# Patient Record
Sex: FEMALE | Race: BLACK OR AFRICAN AMERICAN | Employment: OTHER | URBAN - METROPOLITAN AREA
[De-identification: names, ages, dates, MRNs, and addresses within clinical notes are randomized per-mention and may not be internally consistent; named-entity substitution may affect disease eponyms.]

---

## 2018-10-10 ENCOUNTER — HOSPITAL ENCOUNTER (OUTPATIENT)
Dept: LAB | Age: 73
Discharge: HOME | End: 2018-10-10
Payer: MEDICARE

## 2018-10-10 LAB
ALBUMIN SERPL-MCNC: 3.5 G/DL (ref 3.4–5)
ALBUMIN/GLOB SERPL: 0.9 {RATIO} (ref 0.8–1.7)
ALP SERPL-CCNC: 88 U/L (ref 45–117)
ALT SERPL-CCNC: 23 U/L (ref 13–56)
ANION GAP SERPL CALC-SCNC: 6 MMOL/L (ref 3–18)
AST SERPL-CCNC: 18 U/L (ref 15–37)
BASOPHILS # BLD: 0 K/UL (ref 0–0.1)
BASOPHILS NFR BLD: 0 % (ref 0–2)
BILIRUB SERPL-MCNC: 0.3 MG/DL (ref 0.2–1)
BUN SERPL-MCNC: 27 MG/DL (ref 7–18)
BUN/CREAT SERPL: 24 (ref 12–20)
CALCIUM SERPL-MCNC: 10 MG/DL (ref 8.5–10.1)
CHLORIDE SERPL-SCNC: 101 MMOL/L (ref 100–108)
CO2 SERPL-SCNC: 31 MMOL/L (ref 21–32)
CREAT SERPL-MCNC: 1.13 MG/DL (ref 0.6–1.3)
DIFFERENTIAL METHOD BLD: ABNORMAL
EOSINOPHIL # BLD: 0.1 K/UL (ref 0–0.4)
EOSINOPHIL NFR BLD: 1 % (ref 0–5)
ERYTHROCYTE [DISTWIDTH] IN BLOOD BY AUTOMATED COUNT: 15.8 % (ref 11.6–14.5)
GLOBULIN SER CALC-MCNC: 3.8 G/DL (ref 2–4)
GLUCOSE SERPL-MCNC: 92 MG/DL (ref 74–99)
HCT VFR BLD AUTO: 34.9 % (ref 35–45)
HGB BLD-MCNC: 10.4 G/DL (ref 12–16)
INR PPP: 1 (ref 0.8–1.2)
LYMPHOCYTES # BLD: 2 K/UL (ref 0.9–3.6)
LYMPHOCYTES NFR BLD: 29 % (ref 21–52)
MCH RBC QN AUTO: 22.9 PG (ref 24–34)
MCHC RBC AUTO-ENTMCNC: 29.8 G/DL (ref 31–37)
MCV RBC AUTO: 76.7 FL (ref 74–97)
MONOCYTES # BLD: 0.6 K/UL (ref 0.05–1.2)
MONOCYTES NFR BLD: 8 % (ref 3–10)
NEUTS SEG # BLD: 4.3 K/UL (ref 1.8–8)
NEUTS SEG NFR BLD: 62 % (ref 40–73)
PLATELET # BLD AUTO: 323 K/UL (ref 135–420)
PMV BLD AUTO: 10.4 FL (ref 9.2–11.8)
POTASSIUM SERPL-SCNC: 4.4 MMOL/L (ref 3.5–5.5)
PROT SERPL-MCNC: 7.3 G/DL (ref 6.4–8.2)
PROTHROMBIN TIME: 12.7 SEC (ref 11.5–15.2)
RBC # BLD AUTO: 4.55 M/UL (ref 4.2–5.3)
SODIUM SERPL-SCNC: 138 MMOL/L (ref 136–145)
WBC # BLD AUTO: 7 K/UL (ref 4.6–13.2)

## 2018-10-10 PROCEDURE — 36415 COLL VENOUS BLD VENIPUNCTURE: CPT | Performed by: INTERNAL MEDICINE

## 2018-10-10 PROCEDURE — 80053 COMPREHEN METABOLIC PANEL: CPT | Performed by: INTERNAL MEDICINE

## 2018-10-10 PROCEDURE — 85025 COMPLETE CBC W/AUTO DIFF WBC: CPT | Performed by: INTERNAL MEDICINE

## 2018-10-10 PROCEDURE — 85610 PROTHROMBIN TIME: CPT | Performed by: INTERNAL MEDICINE

## 2018-10-11 LAB
FAX TO INFO,FAXT: NORMAL
FAX TO NUMBER,FAXN: NORMAL

## 2018-10-22 RX ORDER — METOPROLOL SUCCINATE 50 MG/1
TABLET, EXTENDED RELEASE ORAL DAILY
COMMUNITY
End: 2018-12-06

## 2018-10-22 RX ORDER — RANITIDINE 150 MG/1
150 TABLET, FILM COATED ORAL 2 TIMES DAILY
COMMUNITY

## 2018-10-22 RX ORDER — COLESEVELAM 180 1/1
1875 TABLET ORAL
COMMUNITY

## 2018-10-22 RX ORDER — ASPIRIN 81 MG/1
TABLET ORAL DAILY
COMMUNITY

## 2018-10-23 ENCOUNTER — HOSPITAL ENCOUNTER (OUTPATIENT)
Age: 73
Setting detail: OUTPATIENT SURGERY
Discharge: HOME | End: 2018-10-23
Attending: INTERNAL MEDICINE | Admitting: INTERNAL MEDICINE
Payer: MEDICARE

## 2018-10-23 VITALS
OXYGEN SATURATION: 100 % | HEART RATE: 52 BPM | RESPIRATION RATE: 20 BRPM | HEIGHT: 66 IN | SYSTOLIC BLOOD PRESSURE: 133 MMHG | BODY MASS INDEX: 34.84 KG/M2 | TEMPERATURE: 97.9 F | WEIGHT: 216.8 LBS | DIASTOLIC BLOOD PRESSURE: 57 MMHG

## 2018-10-23 DIAGNOSIS — I27.20 PULMONARY HYPERTENSION (HCC): ICD-10-CM

## 2018-10-23 LAB — GLUCOSE BLD STRIP.AUTO-MCNC: 139 MG/DL (ref 70–110)

## 2018-10-23 PROCEDURE — C1769 GUIDE WIRE: HCPCS | Performed by: INTERNAL MEDICINE

## 2018-10-23 PROCEDURE — 82962 GLUCOSE BLOOD TEST: CPT

## 2018-10-23 PROCEDURE — 93451 RIGHT HEART CATH: CPT | Performed by: INTERNAL MEDICINE

## 2018-10-23 PROCEDURE — C1751 CATH, INF, PER/CENT/MIDLINE: HCPCS | Performed by: INTERNAL MEDICINE

## 2018-10-23 PROCEDURE — 77030013797 HC KT TRNSDUC PRSSR EDWD -A: Performed by: INTERNAL MEDICINE

## 2018-10-23 PROCEDURE — 74011250636 HC RX REV CODE- 250/636: Performed by: INTERNAL MEDICINE

## 2018-10-23 PROCEDURE — 77030008543 HC TBNG MON PRSS MRTM -A: Performed by: INTERNAL MEDICINE

## 2018-10-23 RX ORDER — LIDOCAINE HYDROCHLORIDE 10 MG/ML
INJECTION INFILTRATION; PERINEURAL AS NEEDED
Status: DISCONTINUED | OUTPATIENT
Start: 2018-10-23 | End: 2018-10-23 | Stop reason: HOSPADM

## 2018-10-23 RX ORDER — SODIUM CHLORIDE 9 MG/ML
50 INJECTION, SOLUTION INTRAVENOUS CONTINUOUS
Status: DISCONTINUED | OUTPATIENT
Start: 2018-10-23 | End: 2018-10-23 | Stop reason: HOSPADM

## 2018-10-23 RX ORDER — SODIUM CHLORIDE 0.9 % (FLUSH) 0.9 %
5-10 SYRINGE (ML) INJECTION EVERY 8 HOURS
Status: DISCONTINUED | OUTPATIENT
Start: 2018-10-23 | End: 2018-10-23 | Stop reason: HOSPADM

## 2018-10-23 RX ORDER — HEPARIN SODIUM 200 [USP'U]/100ML
INJECTION, SOLUTION INTRAVENOUS
Status: COMPLETED | OUTPATIENT
Start: 2018-10-23 | End: 2018-10-23

## 2018-10-23 RX ORDER — SODIUM CHLORIDE 9 MG/ML
75 INJECTION, SOLUTION INTRAVENOUS CONTINUOUS
Status: DISCONTINUED | OUTPATIENT
Start: 2018-10-23 | End: 2018-10-23

## 2018-10-23 RX ORDER — ADENOSINE 3 MG/ML
INJECTION, SOLUTION INTRAVENOUS
Status: DISCONTINUED | OUTPATIENT
Start: 2018-10-23 | End: 2018-10-23 | Stop reason: HOSPADM

## 2018-10-23 RX ADMIN — SODIUM CHLORIDE 75 ML/HR: 900 INJECTION, SOLUTION INTRAVENOUS at 08:55

## 2018-10-23 RX ADMIN — SODIUM CHLORIDE 50 ML/HR: 900 INJECTION, SOLUTION INTRAVENOUS at 13:07

## 2018-10-23 NOTE — Clinical Note
Right groin clipped, prepped with ChloraPrep and draped. Wet prep solution applied at: 1157. Wet prep solution dried at: 5. Wet prep elapsed drying time: -1152 mins.  Switching to the right groin for the RHC

## 2018-10-23 NOTE — PROCEDURES
RHC attempted via left anti cubital vein but could not able to pass catheter beyond right ventricle and procedure switched and done via right femoral approach. PCWP: 12 mm hg   Mean PA pressure 28-34 mm hg   RV pressure: 49/3 mm hg   Mean RA pressure: 1 mm hg    Cardiac out put is 6.87 l/min with Thermodilution technique and 5.90 l/min by Jarett's method. PVR 0.68    Patient started on Adenosine IV drip at 100 mcg/kg/min and increased to 300 mcg/kg/min and patient requested to stop the infusion due to significant shortness of breath. There was no hypotension or no significant arrhythmias from adenosine. Mean PA pressure was 28-34 mm hg at baseline and dropped to 24-27 mm hg after adenosine. Patient tolerated procedure well. Scant blood loss. No complications. No specimen removed. Recommendation:    Follow up with Pulmonologist for further management.

## 2018-10-23 NOTE — Clinical Note
TRANSFER - OUT REPORT:  
 
Verbal report given to: Boubacar Lopes . Report consisted of patient's Situation, Background, Assessment and  
Recommendations(SBAR). Opportunity for questions and clarification was provided. Patient transported with a Registered Nurse. Patient transported to: CARE UNIT.

## 2018-10-23 NOTE — Clinical Note
Left radial Wet prep solution applied at: 1121. Wet prep solution dried at: 5. Wet prep elapsed drying time: -1116 mins.

## 2018-10-23 NOTE — H&P
Date of Surgery Update: 
Brenna Dumont was seen and examined. History and physical has been reviewed. The patient has been examined. There have been no significant clinical changes since the completion of the originally dated History and Physical. 
 
Patient assessed and is candidate for moderate sedation.    
 
Signed By: Tuyet Neville MD   
 October 23, 2018 11:22 AM

## 2018-10-23 NOTE — DISCHARGE SUMMARY
Discharge Summary    Patient: Anita Gamble MRN: [id number]  CSN: [id number]    YOB: 1945  Age: 68 y.o. Sex: female    DOA: 10/23/2018 LOS:  LOS: 0 days   Discharge Date:      Primary Care Provider:  Other, MD Edmundo    Admission Diagnoses: Pulmonary hypertension ([facility] [address] [I27.20]    Discharge Diagnoses:  Pulmonary hypertension  Problem List as of 10/23/2018 Date Reviewed: 1/20/2016          Codes Class Noted - Resolved    Cortical senile cataract of right eye ICD-10-CM: H25.011  ICD-9-CM: 366.15  1/20/2016 - Present              Discharge Medications:     Current Discharge Medication List      CONTINUE these medications which have NOT CHANGED    Details   aspirin delayed-release 81 mg tablet Take  by mouth daily. omega 3-dha-epa-fish oil (FISH OIL) 100-160-1,000 mg cap Take  by mouth. SITagliptin-metFORMIN (JANUMET) 50-1,000 mg per tablet Take 1 Tab by mouth two (2) times daily (with meals). metoprolol succinate (TOPROL-XL) 50 mg XL tablet Take  by mouth daily. [facility]) 625 mg tablet Take 1,875 mg by mouth every seven (7) days. losartan (COZAAR) 50 mg tablet Take 50 mg by mouth daily. cinnamon bark 500 mg cap Take 1 Cap by mouth two (2) times a day. cholecalciferol, VITAMIN D3, (VITAMIN D3) 5,000 unit tab tablet Take 5,000 Units by mouth daily. raNITIdine (ZANTAC) 150 mg tablet Take 150 mg by mouth two (2) times a day. loratadine (CLARITIN) 10 mg tablet Take 10 mg by mouth daily. Discharge Condition: Stable    Procedures : RHC with vaso reactive study     Consults: None      PHYSICAL EXAM   Visit Vitals  /77 (BP 1 Location: Right arm, BP Patient Position: At rest)   Pulse 67   Temp 98 °F (36.7 °C)   Resp 18   Ht 5' 6\" (1.676 m)   Wt 98.3 kg (216 lb 12.8 oz)   SpO2 98%   Breastfeeding? No   BMI 34.99 kg/m²     General: Awake, cooperative, no acute distress    HEENT: NC, Atraumatic. PERRLA, EOMI.  Anicteric sclerae. Lungs:  CTA Bilaterally. No Wheezing/Rhonchi/Rales. Heart:  Regular  rhythm,  No murmur, No Rubs, No Gallops  Abdomen: Soft, Non distended, Non tender. +Bowel sounds,   Extremities: No c/c/e  Psych:   Not anxious or agitated. Neurologic:  No acute neurological deficits. Admission HPI : see H/P    Hospital Course : Patient came for out patient [address] RHC attempted via left anti cubital vein but could not able to pass catheter beyond right ventricle and procedure switched and done via right femoral approach. PCWP: 12 mm hg   Mean PA pressure 28-34 mm hg   RV pressure: 49/3 mm hg   Mean RA pressure: 1 mm hg    Cardiac out put is 6.87 l/min with Thermodilution technique and 5.90 l/min by Jarett's method. PVR 0.68    Patient started on Adenosine IV drip at 100 mcg/kg/min and increased to 300 mcg/kg/min and patient requested to stop the infusion due to significant shortness of breath. There was no hypotension or no significant arrhythmias from adenosine. Mean PA pressure was 28-34 mm hg at baseline and dropped to 24-27 mm hg after adenosine. Patient tolerated procedure well. Scant blood loss. No complications. No specimen removed. Recommendation:    Follow up with Pulmonologist for further management. Activity: No strenuous activity for 2-3 days     Diet: Cardiac     Follow-up: In cardiology clinic after 2 weeks    Disposition: Home    Minutes spent on discharge: 35 minutes       Labs: Results:       Chemistry No results for input(s): GLU, NA, K, CL, CO2, BUN, CREA, CA, AGAP, BUCR, TBIL, GPT, AP, TP, ALB, GLOB, AGRAT in the last 72 hours. CBC w/Diff No results for input(s): WBC, RBC, HGB, HCT, PLT, GRANS, LYMPH, EOS, HGBEXT, HCTEXT, PLTEXT in the last 72 hours. Cardiac Enzymes No results for input(s): CPK, CKND1, CHIDI in the last 72 hours.     No lab exists for component: CKRMB, TROIP   Coagulation No results for input(s): PTP, INR, APTT in the last 72 hours. No lab exists for component: INREXT    Lipid Panel No results found for: CHOL, CHOLPOCT, CHOLX, CHLST, CHOLV, 311012, HDL, LDL, LDLC, DLDLP, 633669, VLDLC, VLDL, TGLX, TRIGL, TRIGP, TGLPOCT, CHHD, CHHDX   BNP No results for input(s): BNPP in the last 72 hours. Liver Enzymes No results for input(s): TP, ALB, TBIL, AP, SGOT, GPT in the last 72 hours. No lab exists for component: DBIL   Thyroid Studies No results found for: T4, T3U, TSH, TSHEXT         Significant Diagnostic Studies: No results found. No results found for this or any previous visit.         CC: Mk, MD Edmundo

## 2018-10-23 NOTE — DISCHARGE INSTRUCTIONS
DISCHARGE SUMMARY from Nurse    PATIENT INSTRUCTIONS:    After general anesthesia or intravenous sedation, for 24 hours or while taking prescription Narcotics:  · Limit your activities  · Do not drive and operate hazardous machinery  · Do not make important personal or business decisions  · Do  not drink alcoholic beverages  · If you have not urinated within 8 hours after discharge, please contact your surgeon on call. Report the following to your surgeon:  · Excessive pain, swelling, redness or odor of or around the surgical area  · Temperature over 100.5  · Nausea and vomiting lasting longer than 4 hours or if unable to take medications  · Any signs of decreased circulation or nerve impairment to extremity: change in color, persistent  numbness, tingling, coldness or increase pain  · Any questions    What to do at Home:  Recommended activity: Activity as tolerated,       *  Please give a list of your current medications to your Primary Care Provider. *  Please update this list whenever your medications are discontinued, doses are      changed, or new medications (including over-the-counter products) are added. *  Please carry medication information at all times in case of emergency situations. These are general instructions for a healthy lifestyle:    No smoking/ No tobacco products/ Avoid exposure to second hand smoke  Surgeon General's Warning:  Quitting smoking now greatly reduces serious risk to your health. Obesity, smoking, and sedentary lifestyle greatly increases your risk for illness    A healthy diet, regular physical exercise & weight monitoring are important for maintaining a healthy lifestyle    You may be retaining fluid if you have a history of heart failure or if you experience any of the following symptoms:  Weight gain of 3 pounds or more overnight or 5 pounds in a week, increased swelling in our hands or feet or shortness of breath while lying flat in bed.   Please call your doctor as soon as you notice any of these symptoms; do not wait until your next office visit. Recognize signs and symptoms of STROKE:    F-face looks uneven    A-arms unable to move or move unevenly    S-speech slurred or non-existent    T-time-call 911 as soon as signs and symptoms begin-DO NOT go       Back to bed or wait to see if you get better-TIME IS BRAIN. Warning Signs of HEART ATTACK     Call 911 if you have these symptoms:   Chest discomfort. Most heart attacks involve discomfort in the center of the chest that lasts more than a few minutes, or that goes away and comes back. It can feel like uncomfortable pressure, squeezing, fullness, or pain.  Discomfort in other areas of the upper body. Symptoms can include pain or discomfort in one or both arms, the back, neck, jaw, or stomach.  Shortness of breath with or without chest discomfort.  Other signs may include breaking out in a cold sweat, nausea, or lightheadedness. Don't wait more than five minutes to call 911 - MINUTES MATTER! Fast action can save your life. Calling 911 is almost always the fastest way to get lifesaving treatment. Emergency Medical Services staff can begin treatment when they arrive -- up to an hour sooner than if someone gets to the hospital by car. The discharge information has been reviewed with the patient and spouse. The patient and spouse verbalized understanding. Discharge medications reviewed with the patient and spouse and appropriate educational materials and side effects teaching were provided. ___________________________________________________________________________________________________________________________________     Right Heart Catheterization: About This Test  What is it? Right heart catheterization is a test to check the right side of your heart. The right side of the heart receives blood from the body and pumps it to the lungs. The blood picks up oxygen in the lungs.   A doctor will insert a thin, flexible tube (catheter) into a blood vessel in your groin or neck. During the test, the doctor moves the catheter through the blood vessel into your heart. Why is this test done? The test checks the blood pressure and oxygen levels in your lungs and heart. It also checks how well your heart is pumping. This test is different from left heart catheterization. That test checks for blockages in the coronary arteries. How can you prepare for the test?   · Tell your doctors ALL the medicines, vitamins, supplements, and herbal remedies you take. Some of these can increase the risk of bleeding or interact with anesthesia.     · If you take aspirin or some other blood thinner, be sure to talk to your doctor. He or she will tell you if you should stop taking it before your test. Make sure that you understand exactly what your doctor wants you to do.     · Your doctor will tell you which medicines to take or stop before the test. You may need to stop taking certain medicines a week or more before the test. So talk to your doctor as soon as you can. What happens during the test?  · The test is done in a hospital, often in a cardiac catheterization laboratory (\"cath lab\"). You lie on a table under a large X-ray machine. · You will get medicine through an IV in one of your veins to help you relax. · You will be awake during the procedure, but you may not be able to remember much about it. The doctor will inject some medicine to numb the skin where the catheter will be put in. You will feel a small needle stick, like having a blood test. You may feel some pressure when the doctor puts in the catheter. · The doctor may look at X-ray pictures on a monitor (like a TV set) to move the catheter to your heart and lungs. · The catheter will be removed. A nurse may press on a bandage on the opening to prevent bleeding. · The test usually takes about 1 to 1½ hours.   What happens after the test?  · After the test, you will stay in a room for at least a few hours to make sure the catheter site starts to heal. You may have a bandage or a compression device on the catheter site to prevent bleeding. · The catheter site and your heart rate, blood pressure, and temperature will be checked several times over several hours. · If the catheter was placed in your neck, you may sit up in your bed. If the catheter was placed in your groin, you may lie in bed for a few hours. · You may be able to go home later the same day, or you may need to stay in the hospital overnight. Follow-up care is a key part of your treatment and safety. Be sure to make and go to all appointments, and call your doctor if you are having problems. It's also a good idea to keep a list of the medicines you take. Ask your doctor when you can expect to have your test results. Where can you learn more? Go to http://santhosh-john.info/. Enter S610 in the search box to learn more about \"Right Heart Catheterization: About This Test.\"  Current as of: December 6, 2017  Content Version: 11.8  © 7521-4499 [employer]. Care instructions adapted under license by [employer] (which disclaims liability or warranty for this information). If you have questions about a medical condition or this instruction, always ask your healthcare professional. [place] [address] any warranty or liability for your use of this information.     Patient armband removed and shredded

## 2018-10-23 NOTE — PROGRESS NOTES
Pt has had an uneventful recovery. B/P has had a wide pulse pressure, pt asymptomatic. B/P 130'/30-40's. DR Reji Murillo awaiting to see pt before discharge. Dressing to rt groin dry and intact. Discharge instructions reviewed with patient/ spouse and they verbalized all understandings. 1600 Rachel in to see pt and okay for discharge, Pt ambulated in hallway and tolerated well , b/P 133/57 Pt escorted via W/C to car and left in stable condition with no complaints.

## 2018-10-23 NOTE — Clinical Note
[place] catheter inserted. Measurements taken: pressures, sats and thermodilutions.  THREW THE RIGHT FEMORAL ELAINA

## 2018-10-23 NOTE — Clinical Note
TRANSFER - IN REPORT:  
 
Verbal report received from: oneal. Report consisted of patient's Situation, Background, Assessment and  
Recommendations(SBAR). Opportunity for questions and clarification was provided. Assessment completed upon patient's arrival to unit and care assumed. Patient transported with a Registered Nurse.

## 2018-11-19 ENCOUNTER — HOSPITAL ENCOUNTER (OUTPATIENT)
Dept: LAB | Age: 73
Discharge: HOME | End: 2018-11-19
Payer: MEDICARE

## 2018-11-19 LAB
ALBUMIN SERPL-MCNC: 3.6 G/DL (ref 3.4–5)
ALBUMIN/GLOB SERPL: 0.9 {RATIO} (ref 0.8–1.7)
ALP SERPL-CCNC: 80 U/L (ref 45–117)
ALT SERPL-CCNC: 19 U/L (ref 13–56)
ANION GAP SERPL CALC-SCNC: 7 MMOL/L (ref 3–18)
AST SERPL-CCNC: 17 U/L (ref 15–37)
BASOPHILS # BLD: 0 K/UL (ref 0–0.1)
BASOPHILS NFR BLD: 0 % (ref 0–2)
BILIRUB SERPL-MCNC: 0.3 MG/DL (ref 0.2–1)
BUN SERPL-MCNC: 20 MG/DL (ref 7–18)
BUN/CREAT SERPL: 15
CALCIUM SERPL-MCNC: 9.7 MG/DL (ref 8.5–10.1)
CHLORIDE SERPL-SCNC: 99 MMOL/L (ref 100–108)
CO2 SERPL-SCNC: 28 MMOL/L (ref 21–32)
CREAT SERPL-MCNC: 1.3 MG/DL (ref 0.6–1.3)
DIFFERENTIAL METHOD BLD: ABNORMAL
EOSINOPHIL # BLD: 0.1 K/UL (ref 0–0.4)
EOSINOPHIL NFR BLD: 1 % (ref 0–5)
ERYTHROCYTE [DISTWIDTH] IN BLOOD BY AUTOMATED COUNT: 14.6 % (ref 11.6–14.5)
GLOBULIN SER CALC-MCNC: 3.8 G/DL (ref 2–4)
GLUCOSE SERPL-MCNC: 148 MG/DL (ref 74–99)
HCT VFR BLD AUTO: 37.2 % (ref 35–45)
HGB BLD-MCNC: 11.3 G/DL (ref 12–16)
INR PPP: 0.9 (ref 0.8–1.2)
LYMPHOCYTES # BLD: 1.7 K/UL (ref 0.9–3.6)
LYMPHOCYTES NFR BLD: 29 % (ref 21–52)
MCH RBC QN AUTO: 23.3 PG (ref 24–34)
MCHC RBC AUTO-ENTMCNC: 30.4 G/DL (ref 31–37)
MCV RBC AUTO: 76.7 FL (ref 74–97)
MONOCYTES # BLD: 0.3 K/UL (ref 0.05–1.2)
MONOCYTES NFR BLD: 5 % (ref 3–10)
NEUTS SEG # BLD: 3.9 K/UL (ref 1.8–8)
NEUTS SEG NFR BLD: 65 % (ref 40–73)
PLATELET # BLD AUTO: 336 K/UL (ref 135–420)
PMV BLD AUTO: 9.6 FL (ref 9.2–11.8)
POTASSIUM SERPL-SCNC: 4.2 MMOL/L (ref 3.5–5.5)
PROT SERPL-MCNC: 7.4 G/DL (ref 6.4–8.2)
PROTHROMBIN TIME: 12.1 SEC (ref 11.5–15.2)
RBC # BLD AUTO: 4.85 M/UL (ref 4.2–5.3)
SODIUM SERPL-SCNC: 134 MMOL/L (ref 136–145)
WBC # BLD AUTO: 6 K/UL (ref 4.6–13.2)

## 2018-11-19 PROCEDURE — 85025 COMPLETE CBC W/AUTO DIFF WBC: CPT

## 2018-11-19 PROCEDURE — 85610 PROTHROMBIN TIME: CPT

## 2018-11-19 PROCEDURE — 36415 COLL VENOUS BLD VENIPUNCTURE: CPT

## 2018-11-19 PROCEDURE — 80053 COMPREHEN METABOLIC PANEL: CPT

## 2018-11-20 LAB
FAX TO INFO,FAXT: NORMAL
FAX TO NUMBER,FAXN: NORMAL

## 2018-12-04 RX ORDER — RANOLAZINE 500 MG/1
TABLET, EXTENDED RELEASE ORAL 2 TIMES DAILY
COMMUNITY

## 2018-12-04 RX ORDER — CHLORTHALIDONE 25 MG/1
25 TABLET ORAL DAILY
COMMUNITY

## 2018-12-04 RX ORDER — ROSUVASTATIN CALCIUM 10 MG/1
10 TABLET, COATED ORAL
COMMUNITY
End: 2018-12-06

## 2018-12-05 ENCOUNTER — HOSPITAL ENCOUNTER (OUTPATIENT)
Age: 73
Setting detail: OBSERVATION
Discharge: HOME | End: 2018-12-06
Attending: INTERNAL MEDICINE | Admitting: INTERNAL MEDICINE
Payer: MEDICARE

## 2018-12-05 DIAGNOSIS — R94.39 ABNORMAL STRESS TEST: ICD-10-CM

## 2018-12-05 PROBLEM — Z95.5 STENTED CORONARY ARTERY: Status: ACTIVE | Noted: 2018-12-05

## 2018-12-05 LAB
ACT BLD: 194 SECS (ref 79–138)
GLUCOSE BLD STRIP.AUTO-MCNC: 103 MG/DL (ref 70–110)
GLUCOSE BLD STRIP.AUTO-MCNC: 145 MG/DL (ref 70–110)
GLUCOSE BLD STRIP.AUTO-MCNC: 174 MG/DL (ref 70–110)
HCT VFR BLD AUTO: 37.2 % (ref 35–45)
HGB BLD-MCNC: 11.5 G/DL (ref 12–16)

## 2018-12-05 PROCEDURE — 92928 PRQ TCAT PLMT NTRAC ST 1 LES: CPT | Performed by: INTERNAL MEDICINE

## 2018-12-05 PROCEDURE — C1874 STENT, COATED/COV W/DEL SYS: HCPCS | Performed by: INTERNAL MEDICINE

## 2018-12-05 PROCEDURE — 99218 HC RM OBSERVATION: CPT

## 2018-12-05 PROCEDURE — 74011636637 HC RX REV CODE- 636/637: Performed by: INTERNAL MEDICINE

## 2018-12-05 PROCEDURE — 74011250637 HC RX REV CODE- 250/637: Performed by: INTERNAL MEDICINE

## 2018-12-05 PROCEDURE — 74011636320 HC RX REV CODE- 636/320: Performed by: INTERNAL MEDICINE

## 2018-12-05 PROCEDURE — 77030020177 HC ELECTRD DEFIB PD PHIL -B: Performed by: INTERNAL MEDICINE

## 2018-12-05 PROCEDURE — 77030013797 HC KT TRNSDUC PRSSR EDWD -A: Performed by: INTERNAL MEDICINE

## 2018-12-05 PROCEDURE — C1894 INTRO/SHEATH, NON-LASER: HCPCS | Performed by: INTERNAL MEDICINE

## 2018-12-05 PROCEDURE — C1769 GUIDE WIRE: HCPCS | Performed by: INTERNAL MEDICINE

## 2018-12-05 PROCEDURE — C1887 CATHETER, GUIDING: HCPCS | Performed by: INTERNAL MEDICINE

## 2018-12-05 PROCEDURE — C1725 CATH, TRANSLUMIN NON-LASER: HCPCS | Performed by: INTERNAL MEDICINE

## 2018-12-05 PROCEDURE — 74011250636 HC RX REV CODE- 250/636

## 2018-12-05 PROCEDURE — 77030029997 HC DEV COM RDL R BND TELE -B: Performed by: INTERNAL MEDICINE

## 2018-12-05 PROCEDURE — 93458 L HRT ARTERY/VENTRICLE ANGIO: CPT | Performed by: INTERNAL MEDICINE

## 2018-12-05 PROCEDURE — 77030028790 HC ACC ST CTRL VLV COPLT ABBT -B: Performed by: INTERNAL MEDICINE

## 2018-12-05 PROCEDURE — 99153 MOD SED SAME PHYS/QHP EA: CPT | Performed by: INTERNAL MEDICINE

## 2018-12-05 PROCEDURE — 74011000250 HC RX REV CODE- 250: Performed by: INTERNAL MEDICINE

## 2018-12-05 PROCEDURE — 85347 COAGULATION TIME ACTIVATED: CPT

## 2018-12-05 PROCEDURE — 74011250636 HC RX REV CODE- 250/636: Performed by: INTERNAL MEDICINE

## 2018-12-05 PROCEDURE — 77030004522 HC CATH ANGI DX EXPO BSC -A: Performed by: INTERNAL MEDICINE

## 2018-12-05 PROCEDURE — 77030008543 HC TBNG MON PRSS MRTM -A: Performed by: INTERNAL MEDICINE

## 2018-12-05 PROCEDURE — 82962 GLUCOSE BLOOD TEST: CPT

## 2018-12-05 PROCEDURE — 99152 MOD SED SAME PHYS/QHP 5/>YRS: CPT | Performed by: INTERNAL MEDICINE

## 2018-12-05 PROCEDURE — 85018 HEMOGLOBIN: CPT

## 2018-12-05 DEVICE — EVEROLIMUS-ELUTING PLATINUM CHROMIUM CORONARY STENT SYSTEM
Type: IMPLANTABLE DEVICE | Site: CORONARY | Status: FUNCTIONAL
Brand: PROMUS PREMIER™

## 2018-12-05 RX ORDER — DEXTROSE 50 % IN WATER (D50W) INTRAVENOUS SYRINGE
25-50 AS NEEDED
Status: DISCONTINUED | OUTPATIENT
Start: 2018-12-05 | End: 2018-12-06 | Stop reason: HOSPADM

## 2018-12-05 RX ORDER — ENOXAPARIN SODIUM 100 MG/ML
40 INJECTION SUBCUTANEOUS EVERY 24 HOURS
Status: DISCONTINUED | OUTPATIENT
Start: 2018-12-06 | End: 2018-12-06 | Stop reason: HOSPADM

## 2018-12-05 RX ORDER — LOSARTAN POTASSIUM 50 MG/1
100 TABLET ORAL DAILY
Status: DISCONTINUED | OUTPATIENT
Start: 2018-12-06 | End: 2018-12-06 | Stop reason: HOSPADM

## 2018-12-05 RX ORDER — FENTANYL CITRATE 50 UG/ML
INJECTION, SOLUTION INTRAMUSCULAR; INTRAVENOUS AS NEEDED
Status: DISCONTINUED | OUTPATIENT
Start: 2018-12-05 | End: 2018-12-05 | Stop reason: HOSPADM

## 2018-12-05 RX ORDER — SODIUM CHLORIDE 0.9 % (FLUSH) 0.9 %
5-10 SYRINGE (ML) INJECTION EVERY 8 HOURS
Status: DISCONTINUED | OUTPATIENT
Start: 2018-12-05 | End: 2018-12-06 | Stop reason: HOSPADM

## 2018-12-05 RX ORDER — HEPARIN SODIUM 200 [USP'U]/100ML
INJECTION, SOLUTION INTRAVENOUS
Status: COMPLETED | OUTPATIENT
Start: 2018-12-05 | End: 2018-12-05

## 2018-12-05 RX ORDER — EPTIFIBATIDE 0.75 MG/ML
INJECTION, SOLUTION INTRAVENOUS
Status: COMPLETED | OUTPATIENT
Start: 2018-12-05 | End: 2018-12-05

## 2018-12-05 RX ORDER — HEPARIN SODIUM 1000 [USP'U]/ML
INJECTION, SOLUTION INTRAVENOUS; SUBCUTANEOUS AS NEEDED
Status: DISCONTINUED | OUTPATIENT
Start: 2018-12-05 | End: 2018-12-05 | Stop reason: HOSPADM

## 2018-12-05 RX ORDER — ACETAMINOPHEN 325 MG/1
650 TABLET ORAL
Status: DISCONTINUED | OUTPATIENT
Start: 2018-12-05 | End: 2018-12-06 | Stop reason: HOSPADM

## 2018-12-05 RX ORDER — METOPROLOL SUCCINATE 50 MG/1
50 TABLET, EXTENDED RELEASE ORAL DAILY
Status: DISCONTINUED | OUTPATIENT
Start: 2018-12-06 | End: 2018-12-06 | Stop reason: HOSPADM

## 2018-12-05 RX ORDER — CLOPIDOGREL 300 MG/1
TABLET, FILM COATED ORAL AS NEEDED
Status: DISCONTINUED | OUTPATIENT
Start: 2018-12-05 | End: 2018-12-05 | Stop reason: HOSPADM

## 2018-12-05 RX ORDER — EPTIFIBATIDE 0.75 MG/ML
2 INJECTION, SOLUTION INTRAVENOUS CONTINUOUS
Status: ACTIVE | OUTPATIENT
Start: 2018-12-05 | End: 2018-12-05

## 2018-12-05 RX ORDER — ROSUVASTATIN CALCIUM 10 MG/1
20 TABLET, COATED ORAL
Status: DISCONTINUED | OUTPATIENT
Start: 2018-12-05 | End: 2018-12-06 | Stop reason: HOSPADM

## 2018-12-05 RX ORDER — SODIUM CHLORIDE 9 MG/ML
75 INJECTION, SOLUTION INTRAVENOUS CONTINUOUS
Status: DISPENSED | OUTPATIENT
Start: 2018-12-05 | End: 2018-12-06

## 2018-12-05 RX ORDER — EPTIFIBATIDE 2 MG/ML
INJECTION, SOLUTION INTRAVENOUS AS NEEDED
Status: DISCONTINUED | OUTPATIENT
Start: 2018-12-05 | End: 2018-12-05 | Stop reason: HOSPADM

## 2018-12-05 RX ORDER — MAGNESIUM SULFATE 100 %
16 CRYSTALS MISCELLANEOUS AS NEEDED
Status: DISCONTINUED | OUTPATIENT
Start: 2018-12-05 | End: 2018-12-06 | Stop reason: HOSPADM

## 2018-12-05 RX ORDER — LIDOCAINE HYDROCHLORIDE 10 MG/ML
INJECTION INFILTRATION; PERINEURAL AS NEEDED
Status: DISCONTINUED | OUTPATIENT
Start: 2018-12-05 | End: 2018-12-05 | Stop reason: HOSPADM

## 2018-12-05 RX ORDER — INSULIN LISPRO 100 [IU]/ML
INJECTION, SOLUTION INTRAVENOUS; SUBCUTANEOUS
Status: DISCONTINUED | OUTPATIENT
Start: 2018-12-05 | End: 2018-12-05 | Stop reason: SDUPTHER

## 2018-12-05 RX ORDER — VERAPAMIL HYDROCHLORIDE 2.5 MG/ML
INJECTION, SOLUTION INTRAVENOUS AS NEEDED
Status: DISCONTINUED | OUTPATIENT
Start: 2018-12-05 | End: 2018-12-05 | Stop reason: HOSPADM

## 2018-12-05 RX ORDER — SODIUM CHLORIDE 0.9 % (FLUSH) 0.9 %
5-10 SYRINGE (ML) INJECTION AS NEEDED
Status: DISCONTINUED | OUTPATIENT
Start: 2018-12-05 | End: 2018-12-06 | Stop reason: HOSPADM

## 2018-12-05 RX ORDER — CHLORTHALIDONE 25 MG/1
25 TABLET ORAL DAILY
Status: DISCONTINUED | OUTPATIENT
Start: 2018-12-06 | End: 2018-12-06 | Stop reason: HOSPADM

## 2018-12-05 RX ORDER — MIDAZOLAM HYDROCHLORIDE 1 MG/ML
INJECTION, SOLUTION INTRAMUSCULAR; INTRAVENOUS AS NEEDED
Status: DISCONTINUED | OUTPATIENT
Start: 2018-12-05 | End: 2018-12-05 | Stop reason: HOSPADM

## 2018-12-05 RX ORDER — INSULIN LISPRO 100 [IU]/ML
INJECTION, SOLUTION INTRAVENOUS; SUBCUTANEOUS
Status: DISCONTINUED | OUTPATIENT
Start: 2018-12-05 | End: 2018-12-06 | Stop reason: HOSPADM

## 2018-12-05 RX ORDER — AMOXICILLIN 500 MG/1
500 CAPSULE ORAL 2 TIMES DAILY
COMMUNITY

## 2018-12-05 RX ORDER — LABETALOL HCL 20 MG/4 ML
SYRINGE (ML) INTRAVENOUS AS NEEDED
Status: DISCONTINUED | OUTPATIENT
Start: 2018-12-05 | End: 2018-12-05 | Stop reason: HOSPADM

## 2018-12-05 RX ORDER — CLOPIDOGREL BISULFATE 75 MG/1
75 TABLET ORAL DAILY
Status: DISCONTINUED | OUTPATIENT
Start: 2018-12-06 | End: 2018-12-06 | Stop reason: HOSPADM

## 2018-12-05 RX ORDER — ASPIRIN 81 MG/1
81 TABLET ORAL DAILY
Status: DISCONTINUED | OUTPATIENT
Start: 2018-12-06 | End: 2018-12-06 | Stop reason: HOSPADM

## 2018-12-05 RX ADMIN — INSULIN LISPRO 2 UNITS: 100 INJECTION, SOLUTION INTRAVENOUS; SUBCUTANEOUS at 17:26

## 2018-12-05 RX ADMIN — EPTIFIBATIDE 2 MCG/KG/MIN: 0.75 INJECTION, SOLUTION INTRAVENOUS at 17:28

## 2018-12-05 RX ADMIN — SODIUM CHLORIDE 75 ML/HR: 900 INJECTION, SOLUTION INTRAVENOUS at 09:51

## 2018-12-05 RX ADMIN — ACETAMINOPHEN 650 MG: 325 TABLET ORAL at 22:33

## 2018-12-05 RX ADMIN — Medication 10 ML: at 17:27

## 2018-12-05 RX ADMIN — ROSUVASTATIN CALCIUM 20 MG: 10 TABLET, FILM COATED ORAL at 22:33

## 2018-12-05 NOTE — Clinical Note
PROX RCA. Balloon inserted. Balloon inflated using multiple inflations inflation technique. Pressure = 17 philipp; Duration = 12 sec. Inflation 2: Pressure: 18 philipp; Duration: 15 sec. Inflation 3: Pressure: 18 philipp; Duration: 14 sec.

## 2018-12-05 NOTE — PROGRESS NOTES
1815: Patient care received. Patient alert and oriented x 4. Patient resting in bed denies pain. Patient stable. Call light with in reach bed in lowest position. Shift uneventful.

## 2018-12-05 NOTE — Clinical Note
Sheath #2: Dressed using immobilizer dressing. Site: clean, dry, & intact, no bleeding and no hematoma.

## 2018-12-05 NOTE — Clinical Note
RCA. Balloon inserted. Balloon inflated using multiple inflations inflation technique. Pressure = 10 philipp; Duration = 15 sec. Inflation 2: Pressure: 15 philipp; Duration: 15 sec. Inflation 3: Pressure: 16 philipp; Duration: 25 sec. Inflation 4: Pressure: 16 philipp; Duration: 10 sec.

## 2018-12-05 NOTE — H&P
Date of Surgery Update: 
Veronica Raygoza was seen and examined. History and physical has been reviewed. The patient has been examined. There have been no significant clinical changes since the completion of the originally dated History and Physical. 
 
 
Patient assessed and is candidate for moderate sedation.    
 
Signed By: Drake Byrne MD   
 December 5, 2018 11:16 AM

## 2018-12-05 NOTE — ROUTINE PROCESS
Cardiac Cath Lab:  Pre Procedure Chart Check Patients chart was accessed and reviewed for possible and/or scheduled procedure. Creatinine Clearance: 
Serum creatinine: 1.3 mg/dL 11/19/18 3015 Estimated creatinine clearance: 45.6 mL/min Total Contrast  Load: 
3 x estimated clearance amount=  136.8ml 
 
75% of Contrast Load: 0.75 x Total Contrast Load=    102.6ml No results for input(s): WBC, RBC, HCT, HGB, PLT, INR, APTT, PTP, NA, K, BUN, CREA, GFRAA, GFRNA, CA, MAG, CPK, CKMB, CKNDX, CKND1, TROPT, TROIQ, BNPP, BNP, HCTEXT, HGBEXT, PLTEXT in the last 72 hours. No lab exists for component: DDIMER, GLUCOSE, INREXT 
 
BMI: Body mass index is 33.99 kg/m². ALLERGIES:  
Allergies Allergen Reactions  Lisinopril Shortness of Breath and Swelling  Simvastatin Swelling  
  ankles Lines: 
  
  
Peripheral IV 12/05/18 Right Hand (Active) Site Assessment Clean, dry, & intact 12/5/2018  9:49 AM  
Phlebitis Assessment 0 12/5/2018  9:49 AM  
Infiltration Assessment 0 12/5/2018  9:49 AM  
Dressing Status Clean, dry, & intact 12/5/2018  9:49 AM  
Dressing Type Transparent;Tape 12/5/2018  9:49 AM  
Hub Color/Line Status Pink 12/5/2018  9:49 AM  
Action Taken Open ports on tubing capped 12/5/2018  9:49 AM  
Alcohol Cap Used Yes 12/5/2018  9:49 AM  
 
  
 
History: 
 
Past Medical History:  
Diagnosis Date  Diabetes ([address]  GERD (gastroesophageal reflux disease)  Hypertension  Rectal bleeding 11/2015 Past Surgical History:  
Procedure Laterality Date  HX CATARACT REMOVAL  2012  
  bilat. removed [facility]  
 HX ORTHOPAEDIC  2012 ORIF right ankle Patient Active Problem List  
Diagnosis Code  Cortical senile cataract of right eye H25.011

## 2018-12-05 NOTE — ROUTINE PROCESS
TRANSFER - IN REPORT: 
 
Verbal report received from LAUREL Morgan R.N.(name) on Anat Nguyen  being received from Care Unit(unit) for routine progression of care Report consisted of patients Situation, Background, Assessment and  
Recommendations(SBAR). Information from the following report(s) SBAR, Kardex, Procedure Summary, Intake/Output, MAR, Accordion, Recent Results and Med Rec Status was reviewed with the receiving nurse. Opportunity for questions and clarification was provided. Assessment completed upon patients arrival to unit and care assumed.

## 2018-12-05 NOTE — Clinical Note
Balloon inserted. Balloon inflated using multiple inflations inflation technique. Pressure = 8 philipp; Duration = 14 sec.

## 2018-12-05 NOTE — ROUTINE PROCESS
TRANSFER - OUT REPORT: 
 
Verbal report given to BECKY Stallings RN(name) on Pérez Valdivia  being transferred to [facility] (unit) for routine progression of care Report consisted of patients Situation, Background, Assessment and  
Recommendations(SBAR). Information from the following report(s) SBAR, Kardex, Procedure Summary, Intake/Output, MAR and Accordion was reviewed with the receiving nurse. Lines:  
Peripheral IV 12/05/18 Right Hand (Active) Site Assessment Clean, dry, & intact 12/5/2018  9:49 AM  
Phlebitis Assessment 0 12/5/2018  9:49 AM  
Infiltration Assessment 0 12/5/2018  9:49 AM  
Dressing Status Clean, dry, & intact 12/5/2018  9:49 AM  
Dressing Type Transparent;Tape 12/5/2018  9:49 AM  
Hub Color/Line Status Pink 12/5/2018  9:49 AM  
Action Taken Open ports on tubing capped 12/5/2018  9:49 AM  
Alcohol Cap Used Yes 12/5/2018  9:49 AM  
  
 
Opportunity for questions and clarification was provided. Patient transported with: 
 Monitor Registered Nurse Tech

## 2018-12-05 NOTE — PROCEDURES
LHC and Coronary angiogram done via left radial approach due to radial spasm PCI was done from Right radial approach. Coronary anatomy described below with noted coronary artery disease. PCI of  mRCA done with HERNANDEZ with good result. Left main has luminal irregularities. LAD and LCx has luminal irregularities. RCA is dominant vessel. Mid RCA has 95% stenosis. PCI of mRCA done with HERNANDEZ. Promus 4.0X16 mm with good result. LV gram was not done. LVEDP 5-10 mm hg. No significant gradient on pull back. Patient tolerated procedure well. Scant blood loss. No complications. No specimen removed. Recommendation:    Medication considered:     Aspirin, beta blocker, ACEI, Nitrates and statin   Integrilin for 8 hours  Plavix 600 mg one dose followed by 75 mg by mouth once a day. CAD risk factor education  Diet education  Control cholesterol  Blood pressure control  Exercise education: Age and functional status appropriate. Referral to Cardiac rehabilitation done   Plan discussed with patient and family member.

## 2018-12-05 NOTE — ROUTINE PROCESS
Prepped & ready for procedure. 1255 back from cardiac cath. TR band intact to left wrist no bleeding or swelling. TR band intact to right wrist small hematoma noted just above band. Rubia Acevedo RN cath nurse at bedside. Readjusted  TR band with massage of hematoma. Area improved armboard in use.  at bedside. C/o of sl h/a.  
1300 Hematoma noted at right wrist just above TR band. Rubia Acevedo RN cath nurse at bedside. Applied 2nd TR band above the other band on the right wrist. No further hematoma noted. Will monitor. H/a gone now. Tolerated diet well. 1615 TR bands released from right & left wrists. 2x2 & tegaderm applied.

## 2018-12-05 NOTE — Clinical Note
Contrast Dose Calculator:  
Patient's age: 68.  
Patient's sex: Male. Patient weight (kg) = 95.9. Creatinine level (mg/dL) = 1.3. Creatinine clearance (mL/min): 69.67. Max Contrast dose per Creatinine Cl calculator = 156.76 mL.

## 2018-12-05 NOTE — Clinical Note
Patient transported with a Registered Nurse and [address] [place] [address] / [facility].

## 2018-12-05 NOTE — PROGRESS NOTES
Problem: Falls - Risk of 
Goal: *Absence of Falls Document Lizet Nuñez Fall Risk and appropriate interventions in the flowsheet. Outcome: Progressing Towards Goal 
Fall Risk Interventions: 
  
 
  
 
Medication Interventions: Patient to call before getting OOB, Teach patient to arise slowly

## 2018-12-05 NOTE — Clinical Note
TRANSFER - OUT REPORT:  
 
Verbal report given to: Steffany Self. Report consisted of patient's Situation, Background, Assessment and  
Recommendations(SBAR). Opportunity for questions and clarification was provided. Patient transported with a Registered Nurse and [address] [place] [address] / [facility] [employer].

## 2018-12-06 VITALS
HEART RATE: 65 BPM | BODY MASS INDEX: 34.52 KG/M2 | HEIGHT: 66 IN | TEMPERATURE: 97.1 F | RESPIRATION RATE: 18 BRPM | WEIGHT: 214.8 LBS | DIASTOLIC BLOOD PRESSURE: 90 MMHG | OXYGEN SATURATION: 100 % | SYSTOLIC BLOOD PRESSURE: 149 MMHG

## 2018-12-06 LAB
GLUCOSE BLD STRIP.AUTO-MCNC: 108 MG/DL (ref 70–110)
GLUCOSE BLD STRIP.AUTO-MCNC: 139 MG/DL (ref 70–110)

## 2018-12-06 PROCEDURE — 74011250637 HC RX REV CODE- 250/637: Performed by: INTERNAL MEDICINE

## 2018-12-06 PROCEDURE — 82962 GLUCOSE BLOOD TEST: CPT

## 2018-12-06 PROCEDURE — 99218 HC RM OBSERVATION: CPT

## 2018-12-06 PROCEDURE — 74011250636 HC RX REV CODE- 250/636: Performed by: INTERNAL MEDICINE

## 2018-12-06 RX ORDER — METOPROLOL SUCCINATE 50 MG/1
50 TABLET, EXTENDED RELEASE ORAL DAILY
Qty: 30 TAB | Refills: 2 | Status: SHIPPED | OUTPATIENT
Start: 2018-12-07

## 2018-12-06 RX ORDER — ROSUVASTATIN CALCIUM 20 MG/1
20 TABLET, COATED ORAL
Qty: 30 TAB | Refills: 0 | Status: SHIPPED | OUTPATIENT
Start: 2018-12-06

## 2018-12-06 RX ORDER — CLOPIDOGREL BISULFATE 75 MG/1
75 TABLET ORAL DAILY
Qty: 30 TAB | Refills: 3 | Status: SHIPPED | OUTPATIENT
Start: 2018-12-07

## 2018-12-06 RX ADMIN — CLOPIDOGREL BISULFATE 75 MG: 75 TABLET ORAL at 09:20

## 2018-12-06 RX ADMIN — ENOXAPARIN SODIUM 40 MG: 40 INJECTION SUBCUTANEOUS at 09:20

## 2018-12-06 RX ADMIN — ASPIRIN 81 MG: 81 TABLET, COATED ORAL at 09:20

## 2018-12-06 RX ADMIN — LOSARTAN POTASSIUM 100 MG: 50 TABLET ORAL at 09:20

## 2018-12-06 RX ADMIN — METOPROLOL SUCCINATE 50 MG: 50 TABLET, EXTENDED RELEASE ORAL at 09:20

## 2018-12-06 RX ADMIN — CHLORTHALIDONE 25 MG: 25 TABLET ORAL at 09:20

## 2018-12-06 NOTE — DISCHARGE INSTRUCTIONS
DISCHARGE SUMMARY from Nurse    PATIENT INSTRUCTIONS:    After general anesthesia or intravenous sedation, for 24 hours or while taking prescription Narcotics:  · Limit your activities  · Do not drive and operate hazardous machinery  · Do not make important personal or business decisions  · Do  not drink alcoholic beverages  · If you have not urinated within 8 hours after discharge, please contact your surgeon on call. Report the following to your surgeon:  · Excessive pain, swelling, redness or odor of or around the surgical area  · Temperature over 100.5  · Nausea and vomiting lasting longer than 4 hours or if unable to take medications  · Any signs of decreased circulation or nerve impairment to extremity: change in color, persistent  numbness, tingling, coldness or increase pain  · Any questions    What to do at Home:  Recommended activity: Activity as tolerated,     If you experience any of the following symptoms   , please follow up with . *  Please give a list of your current medications to your Primary Care Provider. *  Please update this list whenever your medications are discontinued, doses are      changed, or new medications (including over-the-counter products) are added. *  Please carry medication information at all times in case of emergency situations. These are general instructions for a healthy lifestyle:    No smoking/ No tobacco products/ Avoid exposure to second hand smoke  Surgeon General's Warning:  Quitting smoking now greatly reduces serious risk to your health.     Obesity, smoking, and sedentary lifestyle greatly increases your risk for illness    A healthy diet, regular physical exercise & weight monitoring are important for maintaining a healthy lifestyle    You may be retaining fluid if you have a history of heart failure or if you experience any of the following symptoms:  Weight gain of 3 pounds or more overnight or 5 pounds in a week, increased swelling in our hands or feet or shortness of breath while lying flat in bed. Please call your doctor as soon as you notice any of these symptoms; do not wait until your next office visit. Recognize signs and symptoms of STROKE:    F-face looks uneven    A-arms unable to move or move unevenly    S-speech slurred or non-existent    T-time-call 911 as soon as signs and symptoms begin-DO NOT go       Back to bed or wait to see if you get better-TIME IS BRAIN. Warning Signs of HEART ATTACK     Call 911 if you have these symptoms:   Chest discomfort. Most heart attacks involve discomfort in the center of the chest that lasts more than a few minutes, or that goes away and comes back. It can feel like uncomfortable pressure, squeezing, fullness, or pain.  Discomfort in other areas of the upper body. Symptoms can include pain or discomfort in one or both arms, the back, neck, jaw, or stomach.  Shortness of breath with or without chest discomfort.  Other signs may include breaking out in a cold sweat, nausea, or lightheadedness. Don't wait more than five minutes to call 911 - MINUTES MATTER! Fast action can save your life. Calling 911 is almost always the fastest way to get lifesaving treatment. Emergency Medical Services staff can begin treatment when they arrive -- up to an hour sooner than if someone gets to the hospital by car. The discharge information has been reviewed with the patient. The patient verbalized understanding. Discharge medications reviewed with the patient and appropriate educational materials and side effects teaching were provided.   ___________________________________________________________________________________________________________________________________  DISCHARGE SUMMARY from Nurse    PATIENT INSTRUCTIONS:    After general anesthesia or intravenous sedation, for 24 hours or while taking prescription Narcotics:  · Limit your activities  · Do not drive and operate hazardous machinery  · Do not make important personal or business decisions  · Do  not drink alcoholic beverages  · If you have not urinated within 8 hours after discharge, please contact your surgeon on call. Report the following to your surgeon:  · Excessive pain, swelling, redness or odor of or around the surgical area  · Temperature over 100.5  · Nausea and vomiting lasting longer than 4 hours or if unable to take medications  · Any signs of decreased circulation or nerve impairment to extremity: change in color, persistent  numbness, tingling, coldness or increase pain  · Any questions    What to do at Home:  Recommended activity: Activity as tolerated,     If you experience any of the following symptoms , please follow up with . *  Please give a list of your current medications to your Primary Care Provider. *  Please update this list whenever your medications are discontinued, doses are      changed, or new medications (including over-the-counter products) are added. *  Please carry medication information at all times in case of emergency situations. These are general instructions for a healthy lifestyle:    No smoking/ No tobacco products/ Avoid exposure to second hand smoke  Surgeon General's Warning:  Quitting smoking now greatly reduces serious risk to your health. Obesity, smoking, and sedentary lifestyle greatly increases your risk for illness    A healthy diet, regular physical exercise & weight monitoring are important for maintaining a healthy lifestyle    You may be retaining fluid if you have a history of heart failure or if you experience any of the following symptoms:  Weight gain of 3 pounds or more overnight or 5 pounds in a week, increased swelling in our hands or feet or shortness of breath while lying flat in bed. Please call your doctor as soon as you notice any of these symptoms; do not wait until your next office visit.     Recognize signs and symptoms of STROKE:    F-face looks uneven    A-arms unable to move or move unevenly    S-speech slurred or non-existent    T-time-call 911 as soon as signs and symptoms begin-DO NOT go       Back to bed or wait to see if you get better-TIME IS BRAIN. Warning Signs of HEART ATTACK     Call 911 if you have these symptoms:   Chest discomfort. Most heart attacks involve discomfort in the center of the chest that lasts more than a few minutes, or that goes away and comes back. It can feel like uncomfortable pressure, squeezing, fullness, or pain.  Discomfort in other areas of the upper body. Symptoms can include pain or discomfort in one or both arms, the back, neck, jaw, or stomach.  Shortness of breath with or without chest discomfort.  Other signs may include breaking out in a cold sweat, nausea, or lightheadedness. Don't wait more than five minutes to call 911 - MINUTES MATTER! Fast action can save your life. Calling 911 is almost always the fastest way to get lifesaving treatment. Emergency Medical Services staff can begin treatment when they arrive -- up to an hour sooner than if someone gets to the hospital by car. The discharge information has been reviewed with the patient. The patient verbalized understanding. Discharge medications reviewed with the patient and appropriate educational materials and side effects teaching were provided. ___________________________________________________________________________________________________________________________________                     Cardiac Catheterization/Angiography Discharge Instructions    *Check the puncture site frequently for swelling or bleeding. If you see any bleeding, lie down and apply pressure over the area with a clean town or washcloth. Notify your doctor for any redness, swelling, drainage or oozing from the puncture site. Notify your doctor for any fever or chills. *If the leg or arm with the puncture becomes cold, numb or painful, call Dr Violeta Esteban.     *Activity should be limited for the next 48 hours. Climb stairs as little as possible and avoid any stooping, bending or strenuous activity for 48 hours. No heavy lifting (anything over 10 pounds) for three days. *Do not drive for 48 hours. *You may resume your usual diet. Drink more fluids than usual.    *Have a responsible person drive you home and stay with you for at least 24 hours after your heart catheterization/angiography. *You may remove the bandage from your Right and Arm in 24 hours. You may shower in 24 hours. No tub baths, hot tubs or swimming for one week. Do not place any lotions, creams, powders, ointments over the puncture site for one week. You may place a clean band-aid over the puncture site each day for 5 days. Change this daily. Patient armband removed and shredded.

## 2018-12-06 NOTE — PROGRESS NOTES
Spoke with Dr. Deepali Poe re: pt c/o 3/10 arm pain. Pt requests Tylenol. Also states that Crestor gives her BLE pain; however, she will still take per Dr. Jorge Luis Bermeo orders. Per Dr. Deepali Poe, order Tylenol 650 mg PO every 4 hours as needed.

## 2018-12-06 NOTE — PROGRESS NOTES
Oral and Written notification given to patient and/or caregiver informing them that they are currently an Outpatient receiving care in our facility. Outpatient services include Observation Services under [address] and [facility] requirements.

## 2018-12-06 NOTE — PROGRESS NOTES
Reason for Admission:   Patient admitted under observation for cardiac cath RRAT Score:    10 Plan for utilizing home health:     no  
                 
Likelihood of Readmission:  low Transition of Care Plan:  Met with patient at bedside. She informed she feels well and is ready for d/c home. States she lives with her  and he will drive her home. Patient denies needs or pcp. states she has pcp and medicare and  for insurance. Patient has follow up appointment with Dr. Bessy Roberts 12/20/2018/ 11am. No other needs at this time. Care Management Interventions PCP Verified by CM: Yes Current Support Network: Lives with Spouse Confirm Follow Up Transport: Family Plan discussed with Pt/Family/Caregiver: Yes Freedom of Choice Offered: Yes Discharge Location Discharge Placement: Home with family assistance

## 2018-12-06 NOTE — PROGRESS NOTES
Problem: Falls - Risk of 
Goal: *Absence of Falls Document Rita Lyon Fall Risk and appropriate interventions in the flowsheet. Outcome: Progressing Towards Goal 
Fall Risk Interventions: 
  
 
  
 
Medication Interventions: Assess postural VS orthostatic hypotension

## 2018-12-06 NOTE — ROUTINE PROCESS
Bedside and Verbal shift change report given to ELEONORA Reyes R.N. (oncoming nurse) by BECKY Webb R.N. (offgoing nurse). Report included the following information SBAR, Kardex, Procedure Summary, Intake/Output, MAR, Accordion, Recent Results and Med Rec Status.

## 2018-12-06 NOTE — PROGRESS NOTES
0725 Assumed care of the patient. Pt is alert and oriented. No signs of distress. Pt denies any pain. Bed in low position, call bell within reach. 2223 Shift assessment completed and documented on flowsheets. 0915 Scheduled medication administered as ordered. 1020 IV fluid completed and hand immobilizer removed. 1220 Reassessment completed and documented on flowsheets.

## 2018-12-07 NOTE — DISCHARGE SUMMARY
Discharge Summary    Patient: Kenzie Giron MRN: [id number]  CSN: [id number]    YOB: 1945  Age: 68 y.o. Sex: female    DOA: 12/5/2018 LOS:  LOS: 0 days   Discharge Date: 12/6/2018     Primary Care Provider:  Micha Goode MD    Admission Diagnoses: Abnormal stress test [R94.39], Angina class II    Discharge Diagnoses:  CAD s/p PCI of mRCA  Problem List as of 12/6/2018 Date Reviewed: 1/20/2016          Codes Class Noted - Resolved    Stented coronary artery ICD-10-CM: Z95.5  ICD-9-CM: V45.82  12/5/2018 - Present        Cortical senile cataract of right eye ICD-10-CM: H25.011  ICD-9-CM: 366.15  1/20/2016 - Present              Discharge Medications:     Discharge Medication List as of 12/6/2018 12:54 PM      START taking these medications    Details   clopidogrel (PLAVIX) 75 mg tab Take 1 Tab by mouth daily. , Print, Disp-30 Tab, R-3         CONTINUE these medications which have CHANGED    Details   metoprolol succinate (TOPROL-XL) 50 mg XL tablet Take 1 Tab by mouth daily. , Print, Disp-30 Tab, R-2      rosuvastatin (CRESTOR) 20 mg tablet Take 1 Tab by mouth nightly. , Print, Disp-30 Tab, R-0      SITagliptin-metFORMIN (JANUMET) 50-1,000 mg per tablet Take 1 Tab by mouth two (2) times daily (with meals). , No Print, Disp-30 Tab, R-0 Start on 12/08/2018         CONTINUE these medications which have NOT CHANGED    Details   amoxicillin (AMOXIL) 500 mg capsule Take 500 mg by mouth two (2) times a day., Historical Med      chlorthalidone (HYGROTEN) 25 mg tablet Take 25 mg by mouth daily. , Historical Med      garlic 8174 mg tab Take  by mouth., Historical Med      ranolazine ER (RANEXA) 500 mg SR tablet Take  by mouth two (2) times a day., Historical Med      aspirin delayed-release 81 mg tablet Take  by mouth daily. , Historical Med      omega 3-dha-epa-fish oil (FISH OIL) 100-160-1,000 mg cap Take  by mouth., Historical Med      losartan (COZAAR) 50 mg tablet Take 100 mg by mouth daily. , Historical Med      loratadine (CLARITIN) 10 mg tablet Take 10 mg by mouth daily. , Historical Med      cinnamon bark 500 mg cap Take 1 Cap by mouth two (2) times a day., Historical Med      cholecalciferol, VITAMIN D3, (VITAMIN D3) 5,000 unit tab tablet Take 1,000 Units by mouth daily. , Historical Med      raNITIdine (ZANTAC) 150 mg tablet Take 150 mg by mouth two (2) times a day., Historical Med      colesevelam (WELCHOL) 625 mg tablet Take 1,875 mg by mouth every seven (7) days. , Historical Med             Discharge Condition: Stable    Procedures : LHC, coronary angiogram, PCI of mRCA    Consults: None      PHYSICAL EXAM   Visit Vitals  /90 (BP 1 Location: Left leg, BP Patient Position: At rest;Sitting)   Pulse 65   Temp 97.1 °F (36.2 °C)   Resp 18   Ht 5' 6\" (1.676 m)   Wt 97.4 kg (214 lb 12.8 oz)   SpO2 100%   Breastfeeding? No   BMI 34.67 kg/m²     General: Awake, cooperative, no acute distress    HEENT: NC, Atraumatic. PERRLA, EOMI. Anicteric sclerae. Lungs:  CTA Bilaterally. No Wheezing/Rhonchi/Rales. Heart:  Regular  rhythm,  No murmur, No Rubs, No Gallops  Abdomen: Soft, Non distended, Non tender. +Bowel sounds,   Extremities: No c/c/e Left and right wrist: No swelling, no hematoma, no ecchymosis, left and right radial pulse +, normal motor and sensory exam of left and right hand  Psych:   Not anxious or agitated. Neurologic:  No acute neurological deficits. Admission HPI : See H/P    Hospital Course : Patient came for out patient LHC and coronary angiogram. LHC and Coronary angiogram done via left radial approach due to radial spasm PCI was done from Right radial approach.      Coronary anatomy described below with noted coronary artery disease. PCI of  mRCA done with HERNANDEZ with good result.     Left main has luminal irregularities. LAD and LCx has luminal irregularities. RCA is dominant vessel. Mid RCA has 95% stenosis. PCI of mRCA done with HERNANDEZ.  Alise 4.0X16 mm with good result.     LV gram was not done. LVEDP 5-10 mm hg. No significant gradient on pull back.      Patient tolerated procedure well.      Scant blood loss. No complications. No specimen removed.      Recommendation:     Medication considered:      Aspirin, beta blocker, ACEI, Nitrates and statin   Integrilin for 8 hours  Plavix 600 mg one dose followed by 75 mg by mouth once a day.     CAD risk factor education  Diet education  Control cholesterol  Blood pressure control  Exercise education: Age and functional status appropriate. Referral to Cardiac rehabilitation done   Plan discussed with patient and family member. Patient was admitted for overnight observation. She remained asymptomatic. Hemodynamically stable. No arrhythmias noted on monitor. Activity: No weight lifting not more than 10 lb from both left and right hand for 1 week    Diet: Cardia, diabetic     Follow-up: In cardiology clinic after 2 weeks    Disposition: Home     Minutes spent on discharge: 35 minutes       Labs: Results:       Chemistry No results for input(s): GLU, NA, K, CL, CO2, BUN, CREA, CA, AGAP, BUCR, TBIL, GPT, AP, TP, ALB, GLOB, AGRAT in the last 72 hours. CBC w/Diff Recent Labs     12/05/18  1127   HGB 11.5*   HCT 37.2      Cardiac Enzymes No results for input(s): CPK, CKND1, CHIDI in the last 72 hours. No lab exists for component: CKRMB, TROIP   Coagulation No results for input(s): PTP, INR, APTT in the last 72 hours. No lab exists for component: INREXT    Lipid Panel No results found for: CHOL, CHOLPOCT, CHOLX, CHLST, CHOLV, 257692, HDL, LDL, LDLC, DLDLP, 943720, VLDLC, VLDL, TGLX, TRIGL, TRIGP, TGLPOCT, CHHD, CHHDX   BNP No results for input(s): BNPP in the last 72 hours. Liver Enzymes No results for input(s): TP, ALB, TBIL, AP, SGOT, GPT in the last 72 hours.     No lab exists for component: DBIL   Thyroid Studies No results found for: T4, T3U, TSH, TSHEXT         Significant Diagnostic Studies: No results found. No results found for this or any previous visit.         CC: Clayton Koroma MD

## 2021-09-27 ENCOUNTER — HOSPITAL ENCOUNTER (OUTPATIENT)
Dept: LAB | Age: 76
Discharge: HOME | End: 2021-09-27
Payer: MEDICARE

## 2021-09-27 ENCOUNTER — TRANSCRIBE ORDER (OUTPATIENT)
Dept: REGISTRATION | Age: 76
End: 2021-09-27

## 2021-09-27 DIAGNOSIS — Z01.818 OTHER SPECIFIED PRE-OPERATIVE EXAMINATION: ICD-10-CM

## 2021-09-27 DIAGNOSIS — Z01.818 OTHER SPECIFIED PRE-OPERATIVE EXAMINATION: Primary | ICD-10-CM

## 2021-09-27 LAB
HCT VFR BLD AUTO: 39 % (ref 35–45)
HGB BLD-MCNC: 11.5 G/DL (ref 12–16)
POTASSIUM SERPL-SCNC: 4.6 MMOL/L (ref 3.5–5.5)

## 2021-09-27 PROCEDURE — 85018 HEMOGLOBIN: CPT

## 2021-09-27 PROCEDURE — 84132 ASSAY OF SERUM POTASSIUM: CPT

## 2021-09-27 PROCEDURE — 36415 COLL VENOUS BLD VENIPUNCTURE: CPT

## 2022-03-19 PROBLEM — Z95.5 STENTED CORONARY ARTERY: Status: ACTIVE | Noted: 2018-12-05

## 2023-09-11 RX ORDER — NITROGLYCERIN 0.4 MG/1
0.4 TABLET SUBLINGUAL EVERY 5 MIN PRN
COMMUNITY

## 2023-09-11 RX ORDER — CHLORAL HYDRATE 500 MG
2 CAPSULE ORAL DAILY
COMMUNITY

## 2023-09-11 RX ORDER — EVOLOCUMAB 140 MG/ML
INJECTION, SOLUTION SUBCUTANEOUS
COMMUNITY

## 2023-09-11 RX ORDER — FERROUS SULFATE 325(65) MG
325 TABLET ORAL 2 TIMES DAILY
COMMUNITY

## 2023-09-11 RX ORDER — AMLODIPINE BESYLATE 10 MG/1
10 TABLET ORAL DAILY
COMMUNITY

## 2023-09-11 RX ORDER — OMEPRAZOLE 20 MG/1
20 CAPSULE, DELAYED RELEASE ORAL DAILY
COMMUNITY

## 2023-09-12 ENCOUNTER — HOSPITAL ENCOUNTER (OUTPATIENT)
Facility: HOSPITAL | Age: 78
Setting detail: OUTPATIENT SURGERY
Discharge: HOME | End: 2023-09-12
Attending: INTERNAL MEDICINE | Admitting: INTERNAL MEDICINE
Payer: MEDICARE

## 2023-09-12 VITALS
RESPIRATION RATE: 20 BRPM | WEIGHT: 192 LBS | TEMPERATURE: 98.1 F | BODY MASS INDEX: 30.86 KG/M2 | HEIGHT: 66 IN | SYSTOLIC BLOOD PRESSURE: 157 MMHG | DIASTOLIC BLOOD PRESSURE: 48 MMHG | OXYGEN SATURATION: 100 % | HEART RATE: 61 BPM

## 2023-09-12 DIAGNOSIS — I27.20 PULMONARY HTN (HCC): ICD-10-CM

## 2023-09-12 LAB
ANION GAP SERPL CALC-SCNC: 6 MMOL/L (ref 3–18)
ARTERIAL PATENCY WRIST A: NORMAL
BASE EXCESS BLDV CALC-SCNC: 2.7 MMOL/L
BASOPHILS # BLD: 0 K/UL (ref 0–0.1)
BASOPHILS NFR BLD: 1 % (ref 0–2)
BDY SITE: NORMAL
BUN SERPL-MCNC: 21 MG/DL (ref 7–18)
BUN/CREAT SERPL: 17 (ref 12–20)
CALCIUM SERPL-MCNC: 9.9 MG/DL (ref 8.5–10.1)
CHLORIDE SERPL-SCNC: 105 MMOL/L (ref 100–111)
CO2 SERPL-SCNC: 29 MMOL/L (ref 21–32)
CREAT SERPL-MCNC: 1.21 MG/DL (ref 0.6–1.3)
DIFFERENTIAL METHOD BLD: ABNORMAL
ECHO BSA: 2.01 M2
EOSINOPHIL # BLD: 0.1 K/UL (ref 0–0.4)
EOSINOPHIL NFR BLD: 2 % (ref 0–5)
ERYTHROCYTE [DISTWIDTH] IN BLOOD BY AUTOMATED COUNT: 14.4 % (ref 11.6–14.5)
GLUCOSE SERPL-MCNC: 142 MG/DL (ref 74–99)
HCO3 BLDV-SCNC: 28 MMOL/L (ref 23–28)
HCT VFR BLD AUTO: 35.9 % (ref 35–45)
HGB BLD-MCNC: 10.8 G/DL (ref 12–16)
IMM GRANULOCYTES # BLD AUTO: 0 K/UL (ref 0–0.04)
IMM GRANULOCYTES NFR BLD AUTO: 1 % (ref 0–0.5)
INR PPP: 0.9 (ref 0.9–1.1)
LYMPHOCYTES # BLD: 1.8 K/UL (ref 0.9–3.6)
LYMPHOCYTES NFR BLD: 28 % (ref 21–52)
MCH RBC QN AUTO: 23 PG (ref 24–34)
MCHC RBC AUTO-ENTMCNC: 30.1 G/DL (ref 31–37)
MCV RBC AUTO: 76.5 FL (ref 78–100)
MONOCYTES # BLD: 0.4 K/UL (ref 0.05–1.2)
MONOCYTES NFR BLD: 7 % (ref 3–10)
NEUTS SEG # BLD: 4 K/UL (ref 1.8–8)
NEUTS SEG NFR BLD: 62 % (ref 40–73)
NRBC # BLD: 0 K/UL (ref 0–0.01)
NRBC BLD-RTO: 0 PER 100 WBC
O2/TOTAL GAS SETTING VFR VENT: 21 %
PCO2 BLDV: 44.5 MMHG (ref 41–51)
PH BLDV: 7.41 (ref 7.32–7.42)
PLATELET # BLD AUTO: 306 K/UL (ref 135–420)
PMV BLD AUTO: 10 FL (ref 9.2–11.8)
PO2 BLDV: 39 MMHG (ref 25–40)
POTASSIUM SERPL-SCNC: 4.2 MMOL/L (ref 3.5–5.5)
PROTHROMBIN TIME: 12.7 SEC (ref 11.9–14.7)
RBC # BLD AUTO: 4.69 M/UL (ref 4.2–5.3)
SAO2 % BLDV: 73.1 % (ref 65–88)
SERVICE CMNT-IMP: NORMAL
SODIUM SERPL-SCNC: 140 MMOL/L (ref 136–145)
SPECIMEN TYPE: NORMAL
WBC # BLD AUTO: 6.4 K/UL (ref 4.6–13.2)

## 2023-09-12 PROCEDURE — 6360000002 HC RX W HCPCS: Performed by: INTERNAL MEDICINE

## 2023-09-12 PROCEDURE — 85025 COMPLETE CBC W/AUTO DIFF WBC: CPT

## 2023-09-12 PROCEDURE — 2580000003 HC RX 258: Performed by: INTERNAL MEDICINE

## 2023-09-12 PROCEDURE — 80048 BASIC METABOLIC PNL TOTAL CA: CPT

## 2023-09-12 PROCEDURE — 2500000003 HC RX 250 WO HCPCS: Performed by: INTERNAL MEDICINE

## 2023-09-12 PROCEDURE — 2709999900 HC NON-CHARGEABLE SUPPLY: Performed by: INTERNAL MEDICINE

## 2023-09-12 PROCEDURE — 82803 BLOOD GASES ANY COMBINATION: CPT

## 2023-09-12 PROCEDURE — C1894 INTRO/SHEATH, NON-LASER: HCPCS | Performed by: INTERNAL MEDICINE

## 2023-09-12 PROCEDURE — 85610 PROTHROMBIN TIME: CPT

## 2023-09-12 PROCEDURE — 93451 RIGHT HEART CATH: CPT | Performed by: INTERNAL MEDICINE

## 2023-09-12 RX ORDER — SODIUM CHLORIDE 0.9 % (FLUSH) 0.9 %
5-40 SYRINGE (ML) INJECTION PRN
Status: DISCONTINUED | OUTPATIENT
Start: 2023-09-12 | End: 2023-09-12 | Stop reason: HOSPADM

## 2023-09-12 RX ORDER — HEPARIN SODIUM 1000 [USP'U]/ML
INJECTION, SOLUTION INTRAVENOUS; SUBCUTANEOUS PRN
Status: DISCONTINUED | OUTPATIENT
Start: 2023-09-12 | End: 2023-09-12 | Stop reason: HOSPADM

## 2023-09-12 RX ORDER — HEPARIN SODIUM 200 [USP'U]/100ML
INJECTION, SOLUTION INTRAVENOUS CONTINUOUS PRN
Status: COMPLETED | OUTPATIENT
Start: 2023-09-12 | End: 2023-09-12

## 2023-09-12 RX ORDER — SODIUM CHLORIDE 9 MG/ML
INJECTION, SOLUTION INTRAVENOUS CONTINUOUS
Status: DISPENSED | OUTPATIENT
Start: 2023-09-12 | End: 2023-09-12

## 2023-09-12 RX ORDER — SODIUM CHLORIDE 0.9 % (FLUSH) 0.9 %
5-40 SYRINGE (ML) INJECTION EVERY 12 HOURS SCHEDULED
Status: DISCONTINUED | OUTPATIENT
Start: 2023-09-12 | End: 2023-09-12 | Stop reason: HOSPADM

## 2023-09-12 RX ORDER — ACETAMINOPHEN 325 MG/1
650 TABLET ORAL EVERY 4 HOURS PRN
Status: DISCONTINUED | OUTPATIENT
Start: 2023-09-12 | End: 2023-09-12 | Stop reason: HOSPADM

## 2023-09-12 RX ORDER — SITAGLIPTIN AND METFORMIN HYDROCHLORIDE 1000; 50 MG/1; MG/1
1 TABLET, FILM COATED ORAL 2 TIMES DAILY WITH MEALS
Status: ON HOLD | COMMUNITY
End: 2023-09-12 | Stop reason: HOSPADM

## 2023-09-12 RX ORDER — LIDOCAINE HYDROCHLORIDE 10 MG/ML
INJECTION, SOLUTION INFILTRATION; PERINEURAL PRN
Status: DISCONTINUED | OUTPATIENT
Start: 2023-09-12 | End: 2023-09-12 | Stop reason: HOSPADM

## 2023-09-12 RX ORDER — LOSARTAN POTASSIUM 50 MG/1
50 TABLET ORAL DAILY
Status: ON HOLD | COMMUNITY
End: 2023-09-12 | Stop reason: HOSPADM

## 2023-09-12 RX ORDER — SODIUM CHLORIDE 9 MG/ML
INJECTION, SOLUTION INTRAVENOUS PRN
Status: DISCONTINUED | OUTPATIENT
Start: 2023-09-12 | End: 2023-09-12 | Stop reason: HOSPADM

## 2023-09-12 RX ADMIN — SODIUM CHLORIDE: 9 INJECTION, SOLUTION INTRAVENOUS at 08:12

## 2023-09-12 NOTE — PROGRESS NOTES
Pt is all prepped and ready for procedure. 0855 Pt picked up for procedure    1005 Pt returned to care unit [facility]. Pt awake and alert and tolerated procedure well. Rt brachial accessed was unsuccessful. Rt femoral was accessed successfully and venous sheath present. Site dry and intact. 1102 Rt brachial venous sheath pulled, manual pressure applied for 10 minutes and tolerated well. Sterile 2x2 with tegaderm dressing applied to site. 1105 Rt femoral venous sheath pulled and manual pressure applied to site. No bleeding noted. Sterile 2x2 ith tegaderm dressing applied to site. Precautions reinforced and resting with HOB flat for two hours. 18 Dr Pérez Mimes in to see patient  [facility] increasing at intervals. 1330 Pt sited @ side of bed  1345 Pt ambulated to bathroom and tolerated well. Discharge instructions reviewed with patient and spouse and they verbalized all understandings.    1400 Pt escorted to car via W/C and left with spouse in stable condition

## 2023-09-12 NOTE — DISCHARGE SUMMARY
Discharge Summary    Patient: Jose Alberto Ho MRN: [id number]  CSN: [id number]    YOB: 1945  Age: 69 y.o. Sex: female    DOA: 9/12/2023 LOS:  LOS: 0 days   Discharge Date:      Primary Care Provider:  Nestor Lui MD    Admission Diagnoses: Pulmonary HTN ([address]) [I27.20]    Discharge Diagnoses:  CAD, Pulmonary hypertension     Discharge Condition: Stable    Discharge Medications:        Medication List        CHANGE how you take these medications      losartan 50 MG tablet  Commonly known as: COZAAR  What changed: Another medication with the same name was removed. Continue taking this medication, and follow the directions you see here. sitaGLIPtan-metFORMIN  MG per tablet  Commonly known as: Monia Ee  What changed: Another medication with the same name was removed. Continue taking this medication, and follow the directions you see here.             CONTINUE taking these medications      amLODIPine 10 MG tablet  Commonly known as: NORVASC     aspirin 81 MG EC tablet     chlorthalidone 25 MG tablet  Commonly known as: HYGROTON     Cinnamon 500 MG Caps     ferrous sulfate 325 (65 Fe) MG tablet  Commonly known as: IRON 325     fish oil 1000 MG capsule     Garlic 2033 MG Tabs     loratadine 10 MG tablet  Commonly known as: CLARITIN     metoprolol succinate 50 MG extended release tablet  Commonly known as: TOPROL XL     nitroGLYCERIN 0.4 MG SL tablet  Commonly known as: NITROSTAT     omeprazole 20 MG delayed release capsule  Commonly known as: PRILOSEC     ranolazine 500 MG extended release tablet  Commonly known as: RANEXA     Repatha SureClick 929 MG/ML Soaj  Generic drug: Evolocumab     vitamin D3 125 MCG (5000 UT) Tabs tablet  Commonly known as: CHOLECALCIFEROL            STOP taking these medications      amoxicillin 500 MG capsule  Commonly known as: AMOXIL     clopidogrel 75 MG tablet  Commonly known as: PLAVIX     raNITIdine 150 MG tablet  Commonly known as: ZANTAC     rosuvastatin

## 2023-09-12 NOTE — POST SEDATION
Sedation Post Procedure Note    Patient Name: Rina Mustafa   YOB: 1945  Room/Bed: [facility]/  Medical Record Number: [id number]  Date: 9/12/2023   Time: 10:04 AM         Physicians/Assistants: Yvette Cogan, MD, MD    Procedure Performed:  RHC    Post-Sedation Vital Signs:  Vitals:    09/12/23 1002   BP: (!) 166/64   Pulse: 68   Resp: 12   Temp:    SpO2: 98%      Vital signs were reviewed and were stable after the procedure (see flow sheet for vitals)            Post-Sedation Exam: Lungs: clear and Cardiovascular: normal           Complications: none    Electronically signed by Yvette Cogan, MD on 9/12/2023 at 10:04 AM

## 2023-09-12 NOTE — BRIEF OP NOTE
Brief Postoperative Note      Patient: Domo Jimenez  YOB: 1945  MRN: [id number]    Date of Procedure: 9/12/2023    Pre-Op Diagnosis Codes:     * Pulmonary HTN ([address]) [I27.20]    Post-Op Diagnosis: Same       Procedure(s):  Right heart cath    Surgeon(s):  Nupur Menon MD    Assistant:  * No surgical staff found *    Anesthesia: IV Sedation    Estimated Blood Loss (mL): Minimal    Complications: None    Specimens:   * No specimens in log *    Implants:  * No implants in log *      Drains: * No LDAs found *    Findings: RHC done via Right femoral vein approach. RHC revealed mean PA pressure 25-26 mm hg, PCWP 15 mm hg, RV pressure 50/3/6 mm hg, mean RA pressure 6 mm hg.    Cardiac out put 4.60 l/min and cardiac index 2.34 l/min/m2    PVR 2.39 woods unit     Patient tolerated procedure well. Scant blood loss. No complications. No specimen removed. Recommendation:    Medication considered:   Aspirin, beta blocker, calcium channel blocker, ACEI, Nitrates and statin     CAD risk factor education  Diet education  Control cholesterol  Blood pressure control  Exercise education: Age and functional status appropriate.         Electronically signed by Nupur Menon MD on 9/12/2023 at 10:04 AM

## 2023-09-12 NOTE — INTERVAL H&P NOTE
Update History & Physical    The patient's History and Physical of September 12, 2023 was reviewed with the patient and I examined the patient. There was no change. The surgical site was confirmed by the patient and me. Plan: The risks, benefits, expected outcome, and alternative to the recommended procedure have been discussed with the patient. Patient understands and wants to proceed with the procedure.      Electronically signed by Micheline Gregory MD on 9/12/2023 at 9:01 AM

## 2023-09-12 NOTE — DISCHARGE INSTRUCTIONS
HEART CATHETERIZATION/ANGIOGRAPHY DISCHARGE INSTRUCTIONS    Check puncture site frequently for swelling or bleeding. If there is any bleeding, lie down and apply pressure over the area with a clean towel or washcloth. Notify your doctor for any redness, swelling, drainage, or oozing from the puncture site. Notify your doctor for any fever or chills. If the extremity becomes cold, numb, or painful call Dr. Elena Daniels. Activity should be limited for the next 48 hours. Climb stairs as little as possible and avoid any stooping, bending, or strenuous activity for 48 hours. No heavy lifting (anything over 10 pounds) for 3 days. You may resume your usual diet. Drink more fluids than usual.  Have a responsible person drive you home and stay with you for at least 24 hours after your heart catheterization/angiography. You may remove bandage from your Left and Arm in 24 hours. You may shower in 24 hours. No tub baths, hot tubs, or swimming for 1 week. Do not place any lotions, creams, powders, or ointments over puncture site for 1 week. You may place a clean band-aid over the puncture site each day for 5 days. Change daily. I have read the above instructions and have had the opportunity to ask questions. Patient: ________________________   Date: 9/12/2023    Witness: _______________________   Date: 9/12/2023     General Discharge: Care Instructions  Your Care Instructions     One or more doctors have given you a physical exam, reviewed your symptoms, and asked about your past medical problems. You have had tests as needed. At this time, the doctors feel it is safe for you to go home. It is very important that you follow up with your doctor as directed. If you continue to have symptoms, you may need more tests or treatment. The doctor has checked you carefully, but problems can develop later. If you notice any problems or new symptoms,  get medical treatment right away.   Follow-up care is a key part of your treatment and

## 2023-09-12 NOTE — H&P
with greater than 50% collapse on inspiration. 9. Compared to prior study done 48/5616, LV systolic function is unchanged. Diastolic parameters on today's study are consistent with Grade II diastolic dysfunction (prior Grade I). RVSP has increased from 47 to 57mmHg. Coronary angiogram was done in 10/2018 revealed     Left main has luminal irregularities. Left main bifurcates in LAD and LCx. LAD has luminal irregularities. D1 is medium caliber vessel with luminal irregularities. LAD/D has corkscrew appearance. LCx is non dominant vessel. LCx has luminal irregularities. OM1 is large caliber vessel with luminal irregularities. LCx/OM has corkscrew appearance. RCA is dominant vessel. RCA has luminal irregularities. Mid RCA has 95% stenosis. Mid RCA lesion 95% stenosed. . The pre-interventional distal flow is decreased (NIHARIKA 2). Lesion is the culprit lesion. The lesion is type C, located at the major branch, [facility] Bifurcation Scale 1,1,0, concentric, irregular and tubular. The lesion was not previously treated. There is moderate plaque burden detected. The plaque feature of this lesion is concentric and fibrinous. PCI of mid RCA done with 4.0X16MM -- PROMUS PREMIER stent and post dilated with NON COM 4.0/12 ballon with good results. LVEDP was 5-10 mm hg      PLAN:    Continue aspirin, metoprolol, amlodipine and Repatha  In view of pulmonary hypertension discussed with pulmonologist requested right heart catheterization. Discussed with patient about right heart catheterization with adenosine challenge. All risks, benefits and alternatives explained to patient and she verbally understood.      Discussed with patient about risk of cardiac catheterization including not limited are hematoma, retroperitoneal bleed, pseudoaneurysm, AV fistula, dissection, thrombosis and embolism, radial artery occlusion, myocardial infarction, CVA, TIA, dissection and perforation of great vessels, cardiac perforation,

## 2024-01-17 ENCOUNTER — TRANSCRIBE ORDERS (OUTPATIENT)
Facility: HOSPITAL | Age: 79
End: 2024-01-17

## 2024-01-17 ENCOUNTER — HOSPITAL ENCOUNTER (OUTPATIENT)
Facility: HOSPITAL | Age: 79
Discharge: HOME | End: 2024-01-20
Payer: MEDICARE

## 2024-01-17 DIAGNOSIS — I25.119 ATHEROSCLEROSIS OF NATIVE CORONARY ARTERY WITH ANGINA PECTORIS, UNSPECIFIED WHETHER NATIVE OR TRANSPLANTED HEART (HCC): ICD-10-CM

## 2024-01-17 DIAGNOSIS — I25.119 ATHEROSCLEROSIS OF NATIVE CORONARY ARTERY WITH ANGINA PECTORIS, UNSPECIFIED WHETHER NATIVE OR TRANSPLANTED HEART (HCC): Primary | ICD-10-CM

## 2024-01-17 LAB
ALBUMIN SERPL-MCNC: 3.5 G/DL (ref 3.4–5)
ALBUMIN/GLOB SERPL: 0.9 (ref 0.8–1.7)
ALP SERPL-CCNC: 107 U/L (ref 45–117)
ALT SERPL-CCNC: 12 U/L (ref 13–56)
ANION GAP SERPL CALC-SCNC: 6 MMOL/L (ref 3–18)
AST SERPL-CCNC: 13 U/L (ref 10–38)
BILIRUB SERPL-MCNC: 0.3 MG/DL (ref 0.2–1)
BUN SERPL-MCNC: 20 MG/DL (ref 7–18)
BUN/CREAT SERPL: 17 (ref 12–20)
CALCIUM SERPL-MCNC: 9.9 MG/DL (ref 8.5–10.1)
CHLORIDE SERPL-SCNC: 104 MMOL/L (ref 100–111)
CHOLEST SERPL-MCNC: 172 MG/DL
CO2 SERPL-SCNC: 31 MMOL/L (ref 21–32)
CREAT SERPL-MCNC: 1.2 MG/DL (ref 0.6–1.3)
GLOBULIN SER CALC-MCNC: 3.9 G/DL (ref 2–4)
GLUCOSE SERPL-MCNC: 122 MG/DL (ref 74–99)
HDLC SERPL-MCNC: 57 MG/DL (ref 40–60)
HDLC SERPL: 3 (ref 0–5)
LDLC SERPL CALC-MCNC: 98.4 MG/DL (ref 0–100)
LIPID PANEL: NORMAL
POTASSIUM SERPL-SCNC: 4.4 MMOL/L (ref 3.5–5.5)
PROT SERPL-MCNC: 7.4 G/DL (ref 6.4–8.2)
SODIUM SERPL-SCNC: 141 MMOL/L (ref 136–145)
TRIGL SERPL-MCNC: 83 MG/DL
VLDLC SERPL CALC-MCNC: 16.6 MG/DL

## 2024-01-17 PROCEDURE — 80053 COMPREHEN METABOLIC PANEL: CPT

## 2024-01-17 PROCEDURE — 36415 COLL VENOUS BLD VENIPUNCTURE: CPT

## 2024-01-17 PROCEDURE — 80061 LIPID PANEL: CPT

## 2024-01-18 LAB
FAX TO NUMBER: NORMAL
TEST RESULTS FAXED TO: NORMAL

## (undated) DEVICE — STOPCOCK TRNSDUC 500PSI 3 W ROT M LUER LT BLU OFF HNDL R

## (undated) DEVICE — TUBING PRSS MON L24IN PVC RIG NONEXPANDING M TO FEM CONN

## (undated) DEVICE — PINNACLE PRECISION ACCESS SYSTEM INTRODUCER SHEATH: Brand: PINNACLE PRECISION ACCESS SYSTEM

## (undated) DEVICE — BAND RADIAL COMPR ARTERY 24CM -- REG BX/10

## (undated) DEVICE — RUNTHROUGH NS EXTRA FLOPPY PTCA GUIDEWIRE: Brand: RUNTHROUGH

## (undated) DEVICE — Device

## (undated) DEVICE — PACK PROCEDURE SURG CATH CUST

## (undated) DEVICE — CATHETER GUID 6FR L100CM DIA0.071IN NYL SHFT 3DRC W/O SIDE

## (undated) DEVICE — DRAPE,ANGIO,BRACH,STERILE,38X44: Brand: MEDLINE

## (undated) DEVICE — PROCEDURE KIT FLUID MGMT 10 FR CUST MAINFOLD

## (undated) DEVICE — SWAN-GANZ POLYMER BLEND TRUE SIZE C-TIP CONTROLCATH TD CATHETER: Brand: SWAN-GANZ CONTROLCATH TRUE SIZE

## (undated) DEVICE — TREK CORONARY DILATATION CATHETER 3.0 MM X 12 MM / RAPID-EXCHANGE: Brand: TREK

## (undated) DEVICE — ANGIOGRAPHIC CATHETER: Brand: EXPO™

## (undated) DEVICE — SHIELD RAD 14X16 IN W/ SCOOP ABSORBER

## (undated) DEVICE — PRESSURE MONITORING SET: Brand: TRUWAVE

## (undated) DEVICE — SENSOR PLSE OXMTR AD CBL L36IN ADH FRM FIT SPO2 DISP

## (undated) DEVICE — GLIDESHEATH SLENDER STAINLESS STEEL KIT: Brand: GLIDESHEATH SLENDER

## (undated) DEVICE — SPLINT WR VELC FOAM NEUT POS DISP FOR RAD ART ACC SFT STRP

## (undated) DEVICE — GUIDEWIRE VASC L150CM DIA0.025IN TIP L7CM J RAD 3MM PTFE

## (undated) DEVICE — HEARTSTART ADULT RADIOTRANSPARENT PADS: Brand: HEARTSTART PADS

## (undated) DEVICE — COPILOT KIT INCLUDES BLEEDBACK CONTROL VALVE 20/30 INDEFLATOR INFLATION DEVICE 30 ATM 20 CC / GUIDE WIRE INTRODUCER / TORQUE DEVICE: Brand: INDEFLATOR

## (undated) DEVICE — DRAPE EP LT SUBCLAV ENTRY SHLD SORBX

## (undated) DEVICE — GUIDEWIRE VASC L260CM DIA0.035IN RAD 3MM J TIP L7CM PTFE

## (undated) DEVICE — RADIFOCUS GLIDEWIRE: Brand: GLIDEWIRE

## (undated) DEVICE — 3M™ TEGADERM™ TRANSPARENT FILM DRESSING FIRST AID STYLE, 1620, 2-3/8 IN X 2-3/4 IN, 100 BAGS/CARTON 6 CARTONS/CASE: Brand: 3M™ TEGADERM™